# Patient Record
Sex: FEMALE | Race: ASIAN | URBAN - METROPOLITAN AREA
[De-identification: names, ages, dates, MRNs, and addresses within clinical notes are randomized per-mention and may not be internally consistent; named-entity substitution may affect disease eponyms.]

---

## 2017-02-06 ENCOUNTER — APPOINTMENT (OUTPATIENT)
Dept: URBAN - METROPOLITAN AREA SURGERY 9 | Age: 37
Setting detail: DERMATOLOGY
End: 2017-02-06

## 2017-02-06 DIAGNOSIS — L70.0 ACNE VULGARIS: ICD-10-CM

## 2017-02-06 PROCEDURE — OTHER PRODUCT LINE (ACNE): OTHER

## 2017-02-06 PROCEDURE — OTHER TREATMENT REGIMEN: OTHER

## 2017-02-06 PROCEDURE — OTHER COUNSELING: OTHER

## 2017-02-06 PROCEDURE — 99213 OFFICE O/P EST LOW 20 MIN: CPT

## 2017-02-06 PROCEDURE — OTHER PRESCRIPTION: OTHER

## 2017-02-06 RX ORDER — DAPSONE 75 MG/G
GEL TOPICAL
Qty: 1 | Refills: 5 | Status: ERX | COMMUNITY
Start: 2017-02-06

## 2017-02-06 RX ORDER — DOXYCYCLINE HYCLATE 100 MG/1
CAPSULE, GELATIN COATED ORAL
Qty: 180 | Refills: 1 | Status: ERX

## 2017-02-06 RX ORDER — ADAPALENE 1 MG/G
CREAM TOPICAL
Qty: 1 | Refills: 5 | Status: ERX

## 2017-02-06 RX ORDER — DOXYCYCLINE HYCLATE 100 MG/1
CAPSULE, GELATIN COATED ORAL
Qty: 60 | Refills: 3 | Status: ERX | COMMUNITY
Start: 2017-02-06

## 2017-02-06 RX ORDER — CLINDAMYCIN PHOSPHATE 10 MG/ML
LOTION TOPICAL
Qty: 1 | Refills: 5 | Status: ERX

## 2017-02-06 ASSESSMENT — LOCATION DETAILED DESCRIPTION DERM: LOCATION DETAILED: LEFT INFERIOR CENTRAL MALAR CHEEK

## 2017-02-06 ASSESSMENT — LOCATION SIMPLE DESCRIPTION DERM: LOCATION SIMPLE: LEFT CHEEK

## 2017-02-06 ASSESSMENT — LOCATION ZONE DERM: LOCATION ZONE: FACE

## 2017-02-06 NOTE — PROCEDURE: TREATMENT REGIMEN
Detail Level: Zone
Initiate Treatment: Aczone 7.5% every morning. Resume Differin Cream 0.1% nightly as tolerated and doxy 100 mg twice daily
Otc Regimen: Neutrogena Hydro Boost in addition to CeraVe AMand PM lotion
Continue Regimen: Clindamycin lotion every night

## 2017-02-06 NOTE — PROCEDURE: PRODUCT LINE (ACNE)
Product 40 Price (In Dollars - Numeric Only, No Special Characters Or $): 0.00
Product 1 Units: 1
Product 27 Units: 0
Detail Level: Zone
Product 1 Price (In Dollars - Numeric Only, No Special Characters Or $): 33.00
Render Product Pricing In Note: Yes
Name Of Product 1: Glytone Gel Wash

## 2017-02-07 ENCOUNTER — RX ONLY (RX ONLY)
Age: 37
End: 2017-02-07

## 2017-02-07 RX ORDER — DOXYCYCLINE HYCLATE 100 MG/1
TABLET, COATED ORAL
Qty: 180 | Refills: 1 | Status: ERX | COMMUNITY
Start: 2017-02-07